# Patient Record
Sex: FEMALE | Race: WHITE | Employment: FULL TIME | URBAN - METROPOLITAN AREA
[De-identification: names, ages, dates, MRNs, and addresses within clinical notes are randomized per-mention and may not be internally consistent; named-entity substitution may affect disease eponyms.]

---

## 2021-07-06 ENCOUNTER — HOSPITAL ENCOUNTER (EMERGENCY)
Facility: CLINIC | Age: 21
Discharge: HOME | End: 2021-07-06
Attending: EMERGENCY MEDICINE | Admitting: EMERGENCY MEDICINE
Payer: COMMERCIAL

## 2021-07-06 VITALS
SYSTOLIC BLOOD PRESSURE: 135 MMHG | BODY MASS INDEX: 20.46 KG/M2 | DIASTOLIC BLOOD PRESSURE: 71 MMHG | WEIGHT: 135 LBS | TEMPERATURE: 96.5 F | HEART RATE: 72 BPM | OXYGEN SATURATION: 100 % | RESPIRATION RATE: 13 BRPM | HEIGHT: 68 IN

## 2021-07-06 DIAGNOSIS — R55 VASOVAGAL SYNCOPE: ICD-10-CM

## 2021-07-06 DIAGNOSIS — M54.2 NECK PAIN: ICD-10-CM

## 2021-07-06 LAB
ANION GAP SERPL CALCULATED.3IONS-SCNC: 5 MMOL/L (ref 3–14)
BASOPHILS # BLD AUTO: 0.1 10E9/L (ref 0–0.2)
BASOPHILS NFR BLD AUTO: 0.9 %
BUN SERPL-MCNC: 15 MG/DL (ref 7–30)
CALCIUM SERPL-MCNC: 8.8 MG/DL (ref 8.5–10.1)
CHLORIDE SERPL-SCNC: 107 MMOL/L (ref 94–109)
CO2 SERPL-SCNC: 25 MMOL/L (ref 20–32)
CREAT SERPL-MCNC: 0.84 MG/DL (ref 0.52–1.04)
D DIMER PPP FEU-MCNC: 0.3 UG/ML FEU (ref 0–0.5)
DIFFERENTIAL METHOD BLD: ABNORMAL
EOSINOPHIL # BLD AUTO: 0.2 10E9/L (ref 0–0.7)
EOSINOPHIL NFR BLD AUTO: 2.9 %
ERYTHROCYTE [DISTWIDTH] IN BLOOD BY AUTOMATED COUNT: 12.8 % (ref 10–15)
GFR SERPL CREATININE-BSD FRML MDRD: >90 ML/MIN/{1.73_M2}
GLUCOSE SERPL-MCNC: 102 MG/DL (ref 70–99)
HCG SERPL QL: NEGATIVE
HCT VFR BLD AUTO: 38 % (ref 35–47)
HGB BLD-MCNC: 11.9 G/DL (ref 11.7–15.7)
IMM GRANULOCYTES # BLD: 0 10E9/L (ref 0–0.4)
IMM GRANULOCYTES NFR BLD: 0.2 %
INTERPRETATION ECG - MUSE: NORMAL
LYMPHOCYTES # BLD AUTO: 1.5 10E9/L (ref 0.8–5.3)
LYMPHOCYTES NFR BLD AUTO: 25.9 %
MCH RBC QN AUTO: 25.8 PG (ref 26.5–33)
MCHC RBC AUTO-ENTMCNC: 31.3 G/DL (ref 31.5–36.5)
MCV RBC AUTO: 82 FL (ref 78–100)
MONOCYTES # BLD AUTO: 0.4 10E9/L (ref 0–1.3)
MONOCYTES NFR BLD AUTO: 6 %
NEUTROPHILS # BLD AUTO: 3.7 10E9/L (ref 1.6–8.3)
NEUTROPHILS NFR BLD AUTO: 64.1 %
NRBC # BLD AUTO: 0 10*3/UL
NRBC BLD AUTO-RTO: 0 /100
PLATELET # BLD AUTO: 220 10E9/L (ref 150–450)
POTASSIUM SERPL-SCNC: 3.7 MMOL/L (ref 3.4–5.3)
RBC # BLD AUTO: 4.62 10E12/L (ref 3.8–5.2)
SODIUM SERPL-SCNC: 137 MMOL/L (ref 133–144)
TROPONIN I SERPL-MCNC: <0.015 UG/L (ref 0–0.04)
WBC # BLD AUTO: 5.8 10E9/L (ref 4–11)

## 2021-07-06 PROCEDURE — 258N000003 HC RX IP 258 OP 636: Performed by: EMERGENCY MEDICINE

## 2021-07-06 PROCEDURE — 96361 HYDRATE IV INFUSION ADD-ON: CPT

## 2021-07-06 PROCEDURE — 85025 COMPLETE CBC W/AUTO DIFF WBC: CPT | Performed by: EMERGENCY MEDICINE

## 2021-07-06 PROCEDURE — 85379 FIBRIN DEGRADATION QUANT: CPT | Performed by: EMERGENCY MEDICINE

## 2021-07-06 PROCEDURE — 80048 BASIC METABOLIC PNL TOTAL CA: CPT | Performed by: EMERGENCY MEDICINE

## 2021-07-06 PROCEDURE — 84484 ASSAY OF TROPONIN QUANT: CPT | Performed by: EMERGENCY MEDICINE

## 2021-07-06 PROCEDURE — 93005 ELECTROCARDIOGRAM TRACING: CPT

## 2021-07-06 PROCEDURE — 84703 CHORIONIC GONADOTROPIN ASSAY: CPT | Performed by: EMERGENCY MEDICINE

## 2021-07-06 PROCEDURE — 96374 THER/PROPH/DIAG INJ IV PUSH: CPT

## 2021-07-06 PROCEDURE — 250N000011 HC RX IP 250 OP 636: Performed by: EMERGENCY MEDICINE

## 2021-07-06 PROCEDURE — 250N000013 HC RX MED GY IP 250 OP 250 PS 637: Performed by: EMERGENCY MEDICINE

## 2021-07-06 PROCEDURE — 99284 EMERGENCY DEPT VISIT MOD MDM: CPT | Mod: 25

## 2021-07-06 RX ORDER — KETOROLAC TROMETHAMINE 15 MG/ML
15 INJECTION, SOLUTION INTRAMUSCULAR; INTRAVENOUS ONCE
Status: COMPLETED | OUTPATIENT
Start: 2021-07-06 | End: 2021-07-06

## 2021-07-06 RX ORDER — TIZANIDINE 2 MG/1
2 TABLET ORAL ONCE
Status: COMPLETED | OUTPATIENT
Start: 2021-07-06 | End: 2021-07-06

## 2021-07-06 RX ORDER — TIZANIDINE 2 MG/1
2 TABLET ORAL 3 TIMES DAILY
Qty: 20 TABLET | Refills: 0 | Status: SHIPPED | OUTPATIENT
Start: 2021-07-06

## 2021-07-06 RX ORDER — IBUPROFEN 600 MG/1
600 TABLET, FILM COATED ORAL EVERY 6 HOURS PRN
Qty: 30 TABLET | Refills: 1 | Status: SHIPPED | OUTPATIENT
Start: 2021-07-06

## 2021-07-06 RX ADMIN — KETOROLAC TROMETHAMINE 15 MG: 15 INJECTION, SOLUTION INTRAMUSCULAR; INTRAVENOUS at 10:15

## 2021-07-06 RX ADMIN — SODIUM CHLORIDE 500 ML: 9 INJECTION, SOLUTION INTRAVENOUS at 09:49

## 2021-07-06 RX ADMIN — TIZANIDINE 2 MG: 2 TABLET ORAL at 11:04

## 2021-07-06 ASSESSMENT — ENCOUNTER SYMPTOMS
LIGHT-HEADEDNESS: 1
ABDOMINAL PAIN: 1
APPETITE CHANGE: 0
SHORTNESS OF BREATH: 1
BACK PAIN: 1
DIAPHORESIS: 1

## 2021-07-06 ASSESSMENT — MIFFLIN-ST. JEOR: SCORE: 1420.86

## 2021-07-06 NOTE — ED PROVIDER NOTES
History   Chief Complaint:  Back Pain and Syncope       The history is provided by the patient.      Guillermo Landers is a 21 year old female who presents for evaluation of back pain and syncope. She reports working out at the gym when she felt pain in her upper back after lifting light weights, followed by lightheadedness, diaphoresis, abdominal pain, visual disturbance, and inability to move. The patient reports continued upper back pain, rated 4 or 5 out of 10, which worsens when bending or turning her neck, as well as shortness of breath upon standing. She was with a male  at the gym who reports that her eyes were dilated and she was not responding to him for around 5 minutes. The patient says that she was aware of sitting in the gym, but does not recall him talking to her and thought only 30 seconds had passed. She does mention past syncopal episodes, but says this did not feel similar. She also reports normal appetite recently, and her last period was just under one month ago, with normal timing and bleeding. The patient reports being otherwise healthy, noting that she takes birth control pills and allergy medication.      Review of Systems   Constitutional: Positive for diaphoresis. Negative for appetite change.   Eyes: Positive for visual disturbance.        (+) dilated pupils   Respiratory: Positive for shortness of breath.    Gastrointestinal: Positive for abdominal pain.   Musculoskeletal: Positive for back pain.   Neurological: Positive for syncope and light-headedness.   All other systems reviewed and are negative.      Allergies:  The patient has no known allergies.     Medications:  The patient is currently on no regular medications.    Past Medical History:    The patient does not report any past medical history.     Social History:  The patient presents with her mother and a male . She goes to the gym.    Physical Exam     Patient Vitals for the past 24 hrs:   BP Temp Temp src  "Pulse Resp SpO2 Height Weight   07/06/21 1015 -- -- -- 70 13 100 % -- --   07/06/21 1000 102/69 -- -- 69 14 98 % -- --   07/06/21 0945 116/80 -- -- 69 24 97 % -- --   07/06/21 0903 (!) 142/76 96.5  F (35.8  C) Tympanic 85 18 100 % 1.727 m (5' 8\") 61.2 kg (135 lb)       Physical Exam  Vitals signs and nursing note reviewed.   HENT:      Head: Normocephalic.      Right Ear: Tympanic membrane normal.      Left Ear: Tympanic membrane normal.      Nose: Nose normal.      Mouth/Throat:      Mouth: Mucous membranes are dry.   Eyes:      Extraocular Movements: Extraocular movements intact.      Conjunctiva/sclera: Conjunctivae normal.      Pupils: Pupils are equal, round, and reactive to light.   Neck:      Musculoskeletal: Muscular tenderness present.      Comments: Tenderness over right trapezius, and posterior neck. No swelling or redness noted.    Cardiovascular:      Rate and Rhythm: Normal rate.   Pulmonary:      Effort: Pulmonary effort is normal.   Abdominal:      General: Abdomen is flat.   Musculoskeletal: Normal range of motion.   Skin:     General: Skin is warm.      Capillary Refill: Capillary refill takes less than 2 seconds.   Neurological:      General: No focal deficit present.      Mental Status: She is alert and oriented to person, place, and time.   Psychiatric:         Mood and Affect: Mood normal.           Emergency Department Course   ECG  ECG taken at 0934, ECG read at 0942  Normal sinus rhythm with sinus arrhythmia  Normal ECG   No old EKG.  Rate 77 bpm. MN interval 146 ms. QRS duration 78 ms. QT/QTc 374/423 ms. P-R-T axes 63 61 46.       Laboratory:  CBC: WBC 5.8, HGB 11.9,    BMP: Glucose 102 (H) o/w WNL (Creatinine 0.84)     Troponin (Collected 0919): <0.015  Ddimer: 0.3    HCG qualitative pregnancy: Negative      Emergency Department Course:    Reviewed:  I reviewed nursing notes, vitals, past medical history and care everywhere    Assessments:  0921 I obtained history and examined the " patient as noted above.   1032 I rechecked the patient and explained findings.     Interventions:  0949 NS 500mL IV  1015 Toradol 15mg IV  1104 Zanaflex 2mg Oral    Disposition:  The patient was discharged to home.       Impression & Plan     Medical Decision Making:  Patient presents with nontraumatic neck pain after being at the gym.  Clinical exam is suspicious for musculoskeletal back pain.  This is apparently associated with an episode of what sounds to be near syncope with an episode of unresponsiveness according to the family dilated pupils that since is resolved.  On arrival patient is well-appearing awake and alert with a GCS of 15.  No concerns for subarachnoid hemorrhage patient is at risk for PE though not complaining of chest pain or shortness of breath and vitals are normal.  D-dimer is negative.  Other electrolytes and CBC are normal and EKG is not at risk for significant cardiac arrhythmia.  Patient was offered reassurance clinically suspect musculoskeletal pain and likely vasovagal near syncope.  Patient was offered ice and heat on muscle relaxant and follow-up with primary care.  Patient was offered reassurance and discharge.      Diagnosis:    ICD-10-CM    1. Neck pain  M54.2    2. Vasovagal syncope  R55        Discharge Medications:  New Prescriptions    IBUPROFEN (ADVIL/MOTRIN) 600 MG TABLET    Take 1 tablet (600 mg) by mouth every 6 hours as needed for moderate pain    TIZANIDINE (ZANAFLEX) 2 MG TABLET    Take 1 tablet (2 mg) by mouth 3 times daily       Scribe Disclosure:  Evette NORRIS, am serving as a scribe at 9:20 AM on 7/6/2021 to document services personally performed by Edy Petersen MD based on my observations and the provider's statements to me.          Edy Petersen MD  07/08/21 [id number]

## 2021-07-06 NOTE — ED TRIAGE NOTES
"While at this gym this morning pt developed a sharp right sided back pain under shoulder, per her fiance \"she got very dilated pupils and said vision was blurry\" pt states during this got super how and then really cold  "

## 2021-07-06 NOTE — LETTER
July 6, 2021      To Whom It May Concern:      Guillermo Landers was seen in our Emergency Department today, 07/06/21.  I expect her condition to improve over the next 2 days.  She may return to work/school when improved. Please limit lifting and carrying for next 7 days or until follow up with primary care.      Sincerely,        Edy Petersen MD

## 2021-07-06 NOTE — DISCHARGE INSTRUCTIONS
We have evaluated you for multiple problems that can lead to passing out including blood clots of the lungs arrhythmia dehydration and anemia and these are all normal.  Please use ice or heat to the right neck use ibuprofen every 6 hours use muscle relaxant when needed.  Please sit down or lie down if you get sweaty or clammy as these are symptoms that can lead to passing out.  Please return to the emergency room with fever or worsening condition.  Please follow-up with primary care if neck pain continues and does not improve with time.